# Patient Record
Sex: FEMALE | Race: WHITE | Employment: UNEMPLOYED | URBAN - METROPOLITAN AREA
[De-identification: names, ages, dates, MRNs, and addresses within clinical notes are randomized per-mention and may not be internally consistent; named-entity substitution may affect disease eponyms.]

---

## 2021-11-23 ENCOUNTER — HOSPITAL ENCOUNTER (EMERGENCY)
Facility: CLINIC | Age: 31
Discharge: HOME | End: 2021-11-23
Attending: EMERGENCY MEDICINE | Admitting: NURSE PRACTITIONER
Payer: COMMERCIAL

## 2021-11-23 VITALS
DIASTOLIC BLOOD PRESSURE: 85 MMHG | RESPIRATION RATE: 20 BRPM | TEMPERATURE: 98.7 F | HEIGHT: 64 IN | HEART RATE: 95 BPM | BODY MASS INDEX: 27.31 KG/M2 | WEIGHT: 160 LBS | SYSTOLIC BLOOD PRESSURE: 128 MMHG | OXYGEN SATURATION: 98 %

## 2021-11-23 DIAGNOSIS — Z77.29 CARBON MONOXIDE EXPOSURE: ICD-10-CM

## 2021-11-23 LAB
COHGB MFR BLD: 4.7 % (ref 0–2)
HOLD SPECIMEN: NORMAL
TROPONIN I SERPL HS-MCNC: 3 NG/L
TROPONIN I SERPL HS-MCNC: 4 NG/L
TROPONIN I SERPL-MCNC: <0.015 UG/L (ref 0–0.04)
TROPONIN I SERPL-MCNC: <0.015 UG/L (ref 0–0.04)

## 2021-11-23 PROCEDURE — 93010 ELECTROCARDIOGRAM REPORT: CPT | Mod: 77 | Performed by: NURSE PRACTITIONER

## 2021-11-23 PROCEDURE — 99284 EMERGENCY DEPT VISIT MOD MDM: CPT | Mod: 25 | Performed by: NURSE PRACTITIONER

## 2021-11-23 PROCEDURE — 93005 ELECTROCARDIOGRAM TRACING: CPT | Mod: 76 | Performed by: NURSE PRACTITIONER

## 2021-11-23 PROCEDURE — 36415 COLL VENOUS BLD VENIPUNCTURE: CPT | Performed by: NURSE PRACTITIONER

## 2021-11-23 PROCEDURE — 84484 ASSAY OF TROPONIN QUANT: CPT | Mod: 91 | Performed by: NURSE PRACTITIONER

## 2021-11-23 PROCEDURE — 84484 ASSAY OF TROPONIN QUANT: CPT | Performed by: NURSE PRACTITIONER

## 2021-11-23 PROCEDURE — 82375 ASSAY CARBOXYHB QUANT: CPT | Performed by: NURSE PRACTITIONER

## 2021-11-23 PROCEDURE — 99285 EMERGENCY DEPT VISIT HI MDM: CPT | Performed by: NURSE PRACTITIONER

## 2021-11-23 PROCEDURE — 93005 ELECTROCARDIOGRAM TRACING: CPT | Performed by: NURSE PRACTITIONER

## 2021-11-23 PROCEDURE — 93010 ELECTROCARDIOGRAM REPORT: CPT | Performed by: NURSE PRACTITIONER

## 2021-11-23 ASSESSMENT — MIFFLIN-ST. JEOR: SCORE: 1415.76

## 2021-11-23 ASSESSMENT — ENCOUNTER SYMPTOMS: LIGHT-HEADEDNESS: 1

## 2021-11-23 NOTE — ED PROVIDER NOTES
"  History     Chief Complaint   Patient presents with     Toxic Inhalation     HPI  Anh Avila is a 31 year old female who presents the emergency department for evaluation due to combination exposure.  Patient was in a closed garage when her father was working on a riding lawnmower.  Complaints of lightheadedness.  No headache, syncope, cough, shortness of breath, chest pain.    Allergies:  No Known Allergies    Problem List:    There are no problems to display for this patient.       Past Medical History:    No past medical history on file.    Past Surgical History:    No past surgical history on file.    Family History:    No family history on file.    Social History:  Marital Status:   [2]  Social History     Tobacco Use     Smoking status: Not on file     Smokeless tobacco: Not on file   Substance Use Topics     Alcohol use: Not on file     Drug use: Not on file        Medications:    No current outpatient medications on file.        Review of Systems   Neurological: Positive for light-headedness.   All other systems reviewed and are negative.      Physical Exam   BP: 139/89  Pulse: 97  Temp: 98.7  F (37.1  C)  Resp: 16  Height: 162.6 cm (5' 4\")  Weight: 72.6 kg (160 lb)  SpO2: 99 %      Physical Exam  Constitutional:       General: She is not in acute distress.     Appearance: She is well-developed. She is not diaphoretic.   HENT:      Head: Normocephalic.   Eyes:      Conjunctiva/sclera: Conjunctivae normal.      Pupils: Pupils are equal, round, and reactive to light.   Cardiovascular:      Rate and Rhythm: Normal rate and regular rhythm.      Pulses: Normal pulses.   Pulmonary:      Effort: Pulmonary effort is normal. No respiratory distress.      Breath sounds: Normal breath sounds and air entry. No decreased air movement. No decreased breath sounds, wheezing or rhonchi.   Abdominal:      General: There is no distension.      Palpations: Abdomen is soft.      Tenderness: There is no abdominal " tenderness.   Musculoskeletal:         General: Normal range of motion.      Cervical back: Normal range of motion and neck supple.   Skin:     General: Skin is warm.      Capillary Refill: Capillary refill takes less than 2 seconds.   Neurological:      General: No focal deficit present.      Mental Status: She is alert and oriented to person, place, and time.      GCS: GCS eye subscore is 4. GCS verbal subscore is 5. GCS motor subscore is 6.      Sensory: Sensation is intact.      Motor: Motor function is intact.      Coordination: Coordination is intact.      Gait: Gait is intact.      Deep Tendon Reflexes: Reflexes are normal and symmetric.   Psychiatric:         Mood and Affect: Mood normal.         ED Course        Procedures              EKG Interpretation:      Interpreted by CORNELIA Knight CNP Dr. Diamond  Time reviewed: 1700  Symptoms at time of EKG: none   Rhythm: normal sinus   Rate: normal  Axis: normal  Ectopy: none  Conduction: normal  ST Segments/ T Waves: No ST-T wave changes  Q Waves: none  Comparison to prior: No old EKG available    Clinical Impression: normal EKG    Results for orders placed or performed during the hospital encounter of 11/23/21 (from the past 24 hour(s))   Carbon monoxide   Result Value Ref Range    Carbon Monoxide 4.7 (H) 0.0 - 2.0 %   Troponin I   Result Value Ref Range    Troponin I S 4 <54 ng/L   [place] [facility]    Narrative    The following orders were created for panel order [facility].  Procedure                               Abnormality         Status                     ---------                               -----------         ------                     Extra Purple Top Tube[[id number]]                            In process                   Please view results for these tests on the individual orders.   Troponin I   Result Value Ref Range    Troponin I <0.015 0.000 - 0.045 ug/L     Medications - No data to display    Assessments & Plan (with Medical Decision  Making)   Anh Avila is a 31 year old female who presents the emergency department for evaluation due to combination exposure.  Patient was in a closed garage when her father was working on a riding lawnmower.  Complaints of lightheadedness. Symptoms continue to be improving. Vital signs normal. No worrisome findings on physical exam. Carbon monoxide level 4.7.  EKG without acute changes.  Troponin normal.  Patient placed on oxygen.  Discussed with poison control who advises repeat EKG and troponin in 4 hours from initial level.  No need to repeat carbon monoxide level.  Discharge home if no change in symptoms or worsening labs.  Care handed off to Julia Elliott CNP at shift change.  Patient with no current needs.  I have reviewed the nursing notes.    I have reviewed the findings, diagnosis, plan and need for follow up with the patient.  New Prescriptions    No medications on file     Final diagnoses:   Carbon monoxide exposure     11/23/2021   [facility] EMERGENCY DEPT     Alice Gruber APRN CNP  11/23/21 1752       Alice Gruber APRN CNP  11/23/21 1756

## 2021-11-23 NOTE — ED PROVIDER NOTES
"  History     Chief Complaint   Patient presents with     Toxic Inhalation     HPI       Emergency Department Patient Sign-out       Brief HPI:  This is a 31 year old female signed out to me by Alice Gruber NP .  See initial ED Provider note for details of the presentation.     Patient had a 20-minute exposure inside a garage with a running lawnmower and subsequent lightheadedness.      Significant Events prior to my assuming care: EKG completed and unremarkable.  Troponin completed and no evidence of NSTEMI or STEMI.  Carbon monoxide level and mildly elevated.  Contact with poison control and they recommend oxygen therapy and repeat troponin and EKG in 4 hours with discharged home if no change.         EKG Interpretation:      EKG Number: 2.    Interpreted by Julia Elliott, CORNELIA CNP, Franco Diamond MD  Symptoms at time of EKG: None   Rhythm: Normal sinus   Rate: Normal  Axis: Normal  Ectopy: None  Conduction: Normal  ST Segments/ T Waves: No ST-T wave changes and No acute ischemic changes  Q Waves: None  Comparison to prior: Unchanged from 11/23/2021    Clinical Impression: no acute changes    Exam:   Patient Vitals for the past 24 hrs:   BP Temp Temp src Pulse Resp SpO2 Height Weight   11/23/21 1633 139/89 98.7  F (37.1  C) Oral 97 16 99 % 1.626 m (5' 4\") 72.6 kg (160 lb)           ED RESULTS:   Results for orders placed or performed during the hospital encounter of 11/23/21 (from the past 24 hour(s))   Carbon monoxide     Status: Abnormal    Collection Time: 11/23/21  5:09 PM   Result Value Ref Range    Carbon Monoxide 4.7 (H) 0.0 - 2.0 %   Troponin I     Status: Normal    Collection Time: 11/23/21  5:10 PM   Result Value Ref Range    Troponin I S 4 <54 ng/L   [place] [facility]     Status: None    Collection Time: 11/23/21  5:10 PM    Narrative    The following orders were created for panel order [place] [facility].  Procedure                               Abnormality         Status                     ---------              "                  -----------         ------                     Extra Purple Top Tube[[id number]]                            Final result                 Please view results for these tests on the individual orders.   Extra Purple Top Tube     Status: None    Collection Time: 11/23/21  5:10 PM   Result Value Ref Range    Hold Specimen JIC    Troponin I     Status: Normal    Collection Time: 11/23/21  5:10 PM   Result Value Ref Range    Troponin I <0.015 0.000 - 0.045 ug/L   Troponin I     Status: Normal    Collection Time: 11/23/21  8:02 PM   Result Value Ref Range    Troponin I S 3 <54 ng/L   Troponin I     Status: Normal    Collection Time: 11/23/21  8:02 PM   Result Value Ref Range    Troponin I <0.015 0.000 - 0.045 ug/L       ED MEDICATIONS:   Medications - No data to display      Impression:    ICD-10-CM    1. Carbon monoxide exposure  Z77.29        Plan:    Pending studies include repeat troponin and EKG.  Repeat EKG reveals no acute EKG changes and repeat troponin is unchanged and no evidence of STEMI or NSTEMI.  Discussed home care and worrisome reasons to return.  Mom verbalized understanding.  Patient/mom discharged in stable condition      CORNELIA Gonzalez CNP, CORNELIA Wan CNP  11/23/21 2101       Julia Elliott APRN CNP  11/23/21 2301       Julia Elliott APRN CNP  11/23/21 2306